# Patient Record
Sex: FEMALE | Race: WHITE
[De-identification: names, ages, dates, MRNs, and addresses within clinical notes are randomized per-mention and may not be internally consistent; named-entity substitution may affect disease eponyms.]

---

## 2019-02-12 ENCOUNTER — HOSPITAL ENCOUNTER (EMERGENCY)
Dept: HOSPITAL 44 - ED | Age: 19
Discharge: HOME | End: 2019-02-12
Payer: COMMERCIAL

## 2019-02-12 VITALS — SYSTOLIC BLOOD PRESSURE: 110 MMHG | DIASTOLIC BLOOD PRESSURE: 66 MMHG

## 2019-02-12 DIAGNOSIS — G40.89: Primary | ICD-10-CM

## 2019-02-12 LAB
APPEARANCE UR: (no result)
BASOPHILS NFR BLD: 0.5 % (ref 0–1.5)
CANNABINOIDS CTO UR-MCNC: NEGATIVE NG/ML (ref ?–50)
COLOR,URINE: YELLOW
EGFR (NON-AFRICAN): > 60
EOSINOPHIL NFR BLD: 2.2 % (ref 0–6.8)
MCH RBC QN AUTO: 28.4 PG (ref 28–34)
MCV RBC AUTO: 84 FL (ref 80–100)
MDMA UR-MCNC: NEGATIVE NG/ML (ref ?–500)
MONOCYTES %: 5.2 % (ref 0–11)
NEUTROPHILS #: 3.7 # K/UL (ref 1.4–7.7)
PH UR STRIP: 7.5 [PH] (ref 5–8)
RBC UR QL: NEGATIVE
UROBILINOGEN URINE: 1 EU (ref 0.2–1)

## 2019-02-12 PROCEDURE — S1016 NON-PVC INTRAVENOUS ADMINIST: HCPCS

## 2019-02-12 PROCEDURE — 85025 COMPLETE CBC W/AUTO DIFF WBC: CPT

## 2019-02-12 PROCEDURE — 99284 EMERGENCY DEPT VISIT MOD MDM: CPT

## 2019-02-12 PROCEDURE — 80320 DRUG SCREEN QUANTALCOHOLS: CPT

## 2019-02-12 PROCEDURE — 80377 DRUG/SUBSTANCE NOS 7/MORE: CPT

## 2019-02-12 PROCEDURE — G0481 DRUG TEST DEF 8-14 CLASSES: HCPCS

## 2019-02-12 PROCEDURE — 96374 THER/PROPH/DIAG INJ IV PUSH: CPT

## 2019-02-12 PROCEDURE — 81025 URINE PREGNANCY TEST: CPT

## 2019-02-12 PROCEDURE — G0480 DRUG TEST DEF 1-7 CLASSES: HCPCS

## 2019-02-12 PROCEDURE — 80053 COMPREHEN METABOLIC PANEL: CPT

## 2019-02-12 PROCEDURE — 81002 URINALYSIS NONAUTO W/O SCOPE: CPT

## 2019-02-12 PROCEDURE — 36415 COLL VENOUS BLD VENIPUNCTURE: CPT

## 2019-02-12 PROCEDURE — 99283 EMERGENCY DEPT VISIT LOW MDM: CPT

## 2019-02-12 NOTE — ED PHYSICIAN DOCUMENTATION
Seizure





- HISTORIAN


Historian: patient





- HPI


Stated Complaint: witnessed seizure at [facility] (history no meds)


Chief Complaint: Seizure


Timing/Onset/Duration: single episode, other (2 min (approx 10 GCS with EMS 

arrival via report) )


Last known Well Date: 02/12/19


Last Known Well Time: 09:00


Last known Well Code/Unknown Code: Unknown


Witnessed By: other (school )


Preceding Symptoms: none


Character of Seizure(s): "shaking all over", staring.  denies: incontinence of 

urine, incontinence of stool, stopped breathing, lost pulse


Postictal Symptoms: confusion, other (says she feels numb )


Location of Injury: none


Further Comments: yes (Per EMS she does not take any meds for seizures although 

she has a history of seizures. She is currently awake - alert minimally 

responsive. Shakes head no and yes. Or one word responses. No current seizure 

activity. No obvious injury noted via EMS or school officals)





- ROS


NEURO/PSYCH: denies: headache


EYES/ENT: none


CVS/RESP: none





- PAST HX


Previous seizure/seizure disorder: occasional (last one was 3 months ago - she 

was on seizure med and one year ago they removed her from the med )


Etiology: idiopathic


Other History: none


Surgeries/Procedures: other (heart surgery )


Immunizations: UTD


Allergies/Adverse Reactions: 


                                    Allergies











Allergy/AdvReac Type Severity Reaction Status Date / Time


 


red dye [Red Dye] Allergy Mild  Verified 02/12/19 10:43














Home Medications: 


                                Ambulatory Orders











 Medication  Instructions  Recorded


 


Birth Control   02/12/19














- SOCIAL HX


Smoking History: non-smoker


Alcohol Use: none


Drug Use: none





- FAMILY HX


Family History: none





- VITAL SIGNS


Vital Signs: 


                                   Vital Signs











Temp Pulse Resp BP Pulse Ox


 


 98.9 F   83   14   110/66   98 


 


 02/12/19 10:19  02/12/19 14:56  02/12/19 14:56  02/12/19 14:56  02/12/19 14:56














- REVIEWED ASSESSMENTS


Nursing Assessment  Reviewed: Yes


Vitals Reviewed: Yes





Progress





- Progress


Progress: 





1100: walked with one nurse to bathroom. She is slow to commands and is able to 

follow. She was asking for different pants due to complication of fit. DG


1120: resting in room mom at bedside DG 


1135: mom was requesting to feed her lunch. She is alert aware she is at the 

hospital. Remains slow to respond although awake. She states her stomach is 

burning and she has a headache. No other complaints. DG 


1150: she is anxious in room and jittery. She states she is feeling like she 

might seize again - med ordered DG 


1212: states she is feeling better. DG 


1235: sleeping quietly in room - VS stable DG 


1315: resting. She does respond to name and awken DG 


1420: She is in bed talking to mom. A/OX 3. She is asking to go home. Tolerated 

apple juice well. walked to bathroom. Parents are agreeable DG 





ED Results Lab/Radiology





- Lab Results


Lab Results: 


                                   Lab Results











  02/12/19 02/12/19 02/12/19





  Unknown Unknown 11:07


 


WBC    5.70 K/ul K/ul  





    (4.00-12.00)  


 


RBC    4.94 M/ul M/ul  





    (3.90-5.20)  


 


Hgb    14.0 g/dL g/dL  





    (12.0-16.0)  


 


Hct    41.5 % %  





    (34.5-46.5)  


 


MCV    84.0 fl fl  





    (80.0-100.0)  


 


MCH    28.4 pg pg  





    (28.0-34.0)  


 


MCHC    33.8 g/dL g/dL  





    (30.0-36.0)  


 


RDW    13.2 % %  





    (11.3-14.3)  


 


Plt Count    210 K/mm3 K/mm3  





    (130-400)  


 


Neut % (Auto)    65.0 % %  





    (39.0-79.0)  


 


Lymph % (Auto)    27.1 % %  





    (16.0-50.0)  


 


Mono % (Auto)    5.2 % %  





    (0.0-11.0)  


 


Eos % (Auto)    2.2 % %  





    (0.0-6.8)  


 


Baso % (Auto)    0.5   





    (0.0-1.5)  


 


Neut # (Auto)    3.7 # k/uL # k/uL  





    (1.4-7.7)  


 


Lymph # (Auto)    1.0 # k/uL # k/uL  





    (0.6-4.0)  


 


Mono # (Auto)    0.3 # k/uL # k/uL  





    (0.0-0.9)  


 


Eos # (Auto)    0.1 # k/uL # k/uL  





    (0.0-0.6)  


 


Baso # (Auto)    0.0 # k/uL # k/uL  





    (0.0-0.5)  


 


Sodium  145 mmol/L mmol/L    





   (136-145)   


 


Potassium  4.1 mmol/L mmol/L    





   (3.5-5.1)   


 


Chloride  105 mmol/L mmol/L    





   ()   


 


Carbon Dioxide  26 mmol/L mmol/L    





   (22-30)   


 


BUN  9 mg/dL mg/dL    





   (7-17)   


 


Creatinine  0.75 mg/dL mg/dL    





   (0.52-1.04)   


 


Estimated Creat Clear  127     





    


 


Est GFR ( Amer)  > 60     





  (60 - )  


 


Est GFR (Non-Af Amer)  > 60     





  (60 - )  


 


Glucose  97 mg/dL mg/dL    





   ()   


 


Calcium  9.4 mg/dL mg/dL    





   (8.4-10.2)   


 


Total Bilirubin  0.4 mg/dL mg/dL    





   (0.2-1.3)   


 


AST  28 U/L U/L    





   (15-46)   


 


ALT  33 U/L U/L    





   (13-69)   


 


Alkaline Phosphatase  76 U/L U/L    





   ()   


 


Total Protein  6.8 g/dL g/dL    





   (6.3-8.2)   


 


Albumin  4.3 g/dL g/dL    





   (3.5-5.0)   


 


Urine Color      





    


 


Urine Appearance      





    


 


Urine pH      





    


 


Ur Specific Gravity      





    


 


Urine Protein      





    


 


Urine Ketones      





    


 


Urine Occult Blood      





    


 


Urine Nitrite      





    


 


Urine Bilirubin      





    


 


Urine Urobilinogen      





    


 


Ur Leukocyte Esterase      





    


 


Urine Glucose      





    


 


Urine HCG, Qual      Negative 





     (NEGATIVE) 


 


Opiates Screen      





    


 


Oxycodone Screen      





    


 


Methadone Screen      





    


 


Acetaminophen  < 10.0 ug/mL L ug/mL    





   (10-30)   


 


Ur Barbiturates Screen      





    


 


Tricyclic Antidepress      





    


 


Phencyclidine Screen      





    


 


Amphetamines Screen      





    


 


U Methamphetamines Scrn      





    


 


MDMA      





    


 


Benzodiazepines Screen      





    


 


Urine Cocaine Screen      





    


 


U Cannabinoids Screen      





    


 


Ethyl Alcohol  < 10.0 mg/dL mg/dL    





   (0.0-10.0)   














  02/12/19 02/12/19





  11:07 10:29


 


WBC    





   


 


RBC    





   


 


Hgb    





   


 


Hct    





   


 


MCV    





   


 


MCH    





   


 


MCHC    





   


 


RDW    





   


 


Plt Count    





   


 


Neut % (Auto)    





   


 


Lymph % (Auto)    





   


 


Mono % (Auto)    





   


 


Eos % (Auto)    





   


 


Baso % (Auto)    





   


 


Neut # (Auto)    





   


 


Lymph # (Auto)    





   


 


Mono # (Auto)    





   


 


Eos # (Auto)    





   


 


Baso # (Auto)    





   


 


Sodium    





   


 


Potassium    





   


 


Chloride    





   


 


Carbon Dioxide    





   


 


BUN    





   


 


Creatinine    





   


 


Estimated Creat Clear    





   


 


Est GFR ( Amer)    





   


 


Est GFR (Non-Af Amer)    





   


 


Glucose    





   


 


Calcium    





   


 


Total Bilirubin    





   


 


AST    





   


 


ALT    





   


 


Alkaline Phosphatase    





   


 


Total Protein    





   


 


Albumin    





   


 


Urine Color  Yellow   





   (YELLOW)  


 


Urine Appearance  Cloudy  H   





   (CLEAR)  


 


Urine pH  7.5   





   (5.0 - 8.0)  


 


Ur Specific Gravity  1.020   





   (1.010-1.030)  


 


Urine Protein  Negative mg/dL mg/dL  





   (NEGATIVE)  


 


Urine Ketones  Negative mg/dL mg/dL  





   (NEGATIVE)  


 


Urine Occult Blood  Negative   





   (NEGATIVE)  


 


Urine Nitrite  Negative   





   (NEGATIVE)  


 


Urine Bilirubin  Negative   





   (NEGATIVE)  


 


Urine Urobilinogen  1.0 Eu Eu  





   (0.2-1.0)  


 


Ur Leukocyte Esterase  1+  H   





   (NEGATIVE)  


 


Urine Glucose  Negative mg/dL mg/dL  





   (NEGATIVE)  


 


Urine HCG, Qual    





   


 


Opiates Screen    Negative ng/mL ng/mL





    (<300) 


 


Oxycodone Screen    Negative ng/mL ng/mL





    (<100) 


 


Methadone Screen    Negative ng/mL ng/mL





    (<200) 


 


Acetaminophen    





   


 


Ur Barbiturates Screen    Negative ng.mL ng.mL





    (<200) 


 


Tricyclic Antidepress    Negative ng/mL ng/mL





    (<300) 


 


Phencyclidine Screen    Negative ng/mL ng/mL





    (< 25) 


 


Amphetamines Screen    Negative ng/mL ng/mL





    (<500) 


 


U Methamphetamines Scrn    Negative ng/mL ng/mL





    (<500) 


 


MDMA    Negative ng/mL ng/mL





    (<500) 


 


Benzodiazepines Screen    Negative ng/mL ng/mL





    (<150) 


 


Urine Cocaine Screen    Negative ng/mL ng/mL





    (<150) 


 


U Cannabinoids Screen    Negative ng/mL ng/mL





    (< 50) 


 


Ethyl Alcohol    





   














- Orders


Orders: 


                                    ED Orders











 Category Date Time Status


 


 IV Started NOW Care  02/12/19 10:15 Active


 


 ACETAMINOPHEN LEVEL Routine Lab  02/12/19 Completed


 


 ALCOHOL MEDICAL USE ONLY Routine Lab  02/12/19 Completed


 


 CBC/PLATELET/DIFF Routine Lab  02/12/19 Completed


 


 CMP Routine Lab  02/12/19 Completed


 


 DRUG SCREEN URINE MEDICAL ONLY Routine Lab  02/12/19 10:29 Completed


 


 SALICYLATE LEVEL Routine Lab  02/12/19 Received


 


 UA MACRO DIP ONLY Routine Lab  02/12/19 11:07 Completed


 


 URINE HCG Routine Lab  02/12/19 11:07 Completed


 


 0.9 % Sodium Chloride [Normal Saline] 1,000 ml Med  02/12/19 10:16 Discontinued





 IV NOW   


 


 0.9 % Sodium Chloride [Normal Saline] 1,000 ml Med  02/12/19 10:15 Discontinued





 IV Q1H   


 


 LORazepam [Ativan] Med  02/12/19 11:51 Discontinued





 1 mg IVP NOW ONE   














Seizure Physical Exam





- Physical Exam


General Appearance: no acute distress, alert


Altered Mental Status Higher Functions: alert, abnml respond to command, eyes 

open, slow to respond, disoriented


EENT: nml eye inspection, PERRL, nml ENT inspection


Neck/Back: normal inspection


Respiratory: no resp. distress, breath sounds nml, no evidence of rib injury


CVS: reg rate & rhythm, heart sounds normal, equal pulses, no murmur


Skin: warm/dry, normal color


Extremities: normal range of motion, non-tender, normal inspection


Observed Seizure Activity in ED: awake





Discharge


Clincal Impression: 


 Pseudoseizure





Referrals: 


Shari Carson MD [Primary Care Provider] - 2 Days


Comments: 





1. FOLLOW UP WITH PCP ASAP


2. rest - brain rest today


3. Increase fluids


4. Someone with her for next 24 hours


5. Return to ER for any concerns 


Condition: Stable


Disposition: 01 HOME, SELF-CARE


Decision to Admit: NO


Date of Decison to Admit: 02/12/19


Decision Time: 14:34

## 2019-04-03 ENCOUNTER — HOSPITAL ENCOUNTER (EMERGENCY)
Dept: HOSPITAL 44 - ED | Age: 19
Discharge: HOME | End: 2019-04-03
Payer: SELF-PAY

## 2019-04-03 VITALS
DIASTOLIC BLOOD PRESSURE: 62 MMHG | SYSTOLIC BLOOD PRESSURE: 110 MMHG | SYSTOLIC BLOOD PRESSURE: 110 MMHG | DIASTOLIC BLOOD PRESSURE: 62 MMHG

## 2019-04-03 DIAGNOSIS — G40.89: Primary | ICD-10-CM

## 2019-04-03 LAB
BASOPHILS NFR BLD: 0.8 % (ref 0–1.5)
EGFR (NON-AFRICAN): > 60
EOSINOPHIL NFR BLD: 1.6 % (ref 0–6.8)
MCH RBC QN AUTO: 28.1 PG (ref 28–34)
MCV RBC AUTO: 85 FL (ref 80–100)
MONOCYTES %: 6.2 % (ref 0–11)
NEUTROPHILS #: 3.4 # K/UL (ref 1.4–7.7)

## 2019-04-03 PROCEDURE — 36415 COLL VENOUS BLD VENIPUNCTURE: CPT

## 2019-04-03 PROCEDURE — 80053 COMPREHEN METABOLIC PANEL: CPT

## 2019-04-03 PROCEDURE — 96374 THER/PROPH/DIAG INJ IV PUSH: CPT

## 2019-04-03 PROCEDURE — 99284 EMERGENCY DEPT VISIT MOD MDM: CPT

## 2019-04-03 PROCEDURE — 85025 COMPLETE CBC W/AUTO DIFF WBC: CPT

## 2019-04-03 PROCEDURE — 99283 EMERGENCY DEPT VISIT LOW MDM: CPT

## 2019-04-03 NOTE — ED PHYSICIAN DOCUMENTATION
Seizure





- HISTORIAN


Historian: patient, paramedics ([facility])





- [facility]


Chief Complaint: Seizure (? Seizure)


Additional Information: 


Patient is a 18-year-old female who presents to the ER via [facility] from [facility] [employer] 

[employer].  Per EMS patient was having jerking movements of the extremity- per 

school nurse she had jerking of extremities for approx. 20 mins. When EMS got 

her in the truck they started talking to her and jerking stopped.  Last seizure 

episode was one month ago- episode was more anxiety.  She appears to be anxious 

today- denies any stress, denies problems at school or home.  She states that 

she has been working out more and lifting weights- we discussed healthy 

nutrition and hydration. 





Timing/Onset/Duration: unknown duration (maybe 20 min per school nurse), single 

episode


Last known Well Date: 04/03/19


Last Known Well Time: 09:00


Last known Well Code/Unknown Code: Known


Witnessed By: other (school/nurse)


Preceding Symptoms: none


Activity Prior to Seizure: sitting in class


Character of Seizure(s): shaking in one area (shaking of arms and legs).  

denies: lost consciousness, unresponsiveness


Postictal Symptoms: none


Location of Injury: none


Further Comments: yes (EMS state that she is appearing well- and she stopped 

activity)





- ROS


NEURO/PSYCH: denies: headache


EYES/ENT: none


CVS/RESP: none


GI/: denies: nausea, vomiting


MS/SKIN/LYMPH: none





- PAST HX


Previous seizure/seizure disorder: occasional (last episode 1 mth ago), other 

(not diagnosed- more anxitye)


Etiology: idiopathic


Other History: none


Surgeries/Procedures: none


Immunizations: UTD


Allergies/Adverse Reactions: 


                                    Allergies











Allergy/AdvReac Type Severity Reaction Status Date / Time


 


red dye [Red Dye] Allergy Mild  Verified 04/03/19 10:01














Home Medications: 


                                Ambulatory Orders











 Medication  Instructions  Recorded


 


Levonorgestrel-Eth Estra [Enpresse] 1 tab PO DAILY 02/12/19














- SOCIAL HX


Smoking History: non-smoker


Alcohol Use: none


Drug Use: none





- FAMILY HX


Family History: none





- VITAL SIGNS


Vital Signs: 


                                   Vital Signs











Temp Pulse Resp BP Pulse Ox


 


 98.4 F   74   20   110/62   99 


 


 04/03/19 09:37  04/03/19 11:11  04/03/19 11:11  04/03/19 11:11  04/03/19 11:11














ED Results Lab/Radiology





- Lab Results


Lab Results: 


                                   Lab Results











  04/03/19 04/03/19





  09:55 09:55


 


WBC    5.70 K/ul K/ul





    (4.00-12.00) 


 


RBC    4.56 M/ul M/ul





    (3.90-5.20) 


 


Hgb    12.8 g/dL g/dL





    (12.0-16.0) 


 


Hct    38.5 % %





    (34.5-46.5) 


 


MCV    85.0 fl fl





    (80.0-100.0) 


 


MCH    28.1 pg pg





    (28.0-34.0) 


 


MCHC    33.2 g/dL g/dL





    (30.0-36.0) 


 


RDW    13.3 % %





    (11.3-14.3) 


 


Plt Count    188 K/mm3 K/mm3





    (130-400) 


 


Neut % (Auto)    60.0 % %





    (39.0-79.0) 


 


Lymph % (Auto)    31.4 % %





    (16.0-50.0) 


 


Mono % (Auto)    6.2 % %





    (0.0-11.0) 


 


Eos % (Auto)    1.6 % %





    (0.0-6.8) 


 


Baso % (Auto)    0.8 % %





    (0.0-1.5) 


 


Neut # (Auto)    3.4 # k/uL # k/uL





    (1.4-7.7) 


 


Lymph # (Auto)    1.8 # k/uL # k/uL





    (0.6-4.0) 


 


Mono # (Auto)    0.4 # k/uL # k/uL





    (0.0-0.9) 


 


Eos # (Auto)    0.1 # k/uL # k/uL





    (0.0-0.6) 


 


Baso # (Auto)    0.1 # k/uL # k/uL





    (0.0-0.5) 


 


Sodium  138 mmol/L mmol/L  





   (136-145)  


 


Potassium  3.6 mmol/L mmol/L  





   (3.5-5.1)  


 


Chloride  103 mmol/L mmol/L  





   ()  


 


Carbon Dioxide  24 mmol/L mmol/L  





   (22-30)  


 


BUN  13 mg/dL mg/dL  





   (7-17)  


 


Creatinine  0.66 mg/dL mg/dL  





   (0.52-1.04)  


 


Est GFR ( Amer)  > 60   





  (60 - ) 


 


Est GFR (Non-Af Amer)  > 60   





  (60 - ) 


 


Glucose  90 mg/dL mg/dL  





   ()  


 


Calcium  9.1 mg/dL mg/dL  





   (8.4-10.2)  


 


Total Bilirubin  < 0.1 mg/dL L mg/dL  





   (0.2-1.3)  


 


AST  31 U/L U/L  





   (15-46)  


 


ALT  13 U/L U/L  





   (13-69)  


 


Alkaline Phosphatase  76 U/L U/L  





   ()  


 


Total Protein  7.4 g/dL g/dL  





   (6.3-8.2)  


 


Albumin  4.2 g/dL g/dL  





   (3.5-5.0)  














- Orders


Orders: 


                                    ED Orders











 Category Date Time Status


 


 Place IV Lock 1T Care  04/03/19 09:42 Active


 


 CBC/PLATELET/DIFF Stat Lab  04/03/19 09:55 Completed


 


 CMP Stat Lab  04/03/19 09:55 Completed


 


 LORazepam [Ativan] Med  04/03/19 09:42 Discontinued





 0.5 mg IV NOW ONE   














Seizure Physical Exam





- Physical Exam


General Appearance: alert, anxious


Altered Mental Status Higher Functions: alert, oriented x3


EENT: PERRL


Neck/Back: normal inspection, supple


Respiratory: breath sounds nml


CVS: heart sounds normal, equal pulses


Abdomen: non-tender, nml bowel sounds, no distention


Skin: warm/dry, normal color


Extremities: normal range of motion, non-tender, normal inspection, normal 

capillary refill


Observed Seizure Activity in ED: other (no seizure in ER )





Discharge


Clincal Impression: 


 Pseudoseizure





Referrals: 


Shari Carson MD [Primary Care Provider] - 2 Days


Additional Instructions: 


Increase fluid intake and Gatorade when working out.


Balanced diet while exercising


Follow up with PCP next week for follow up


Condition: Good


Disposition: 01 HOME, SELF-CARE


Decision to Admit: NO


Decision Time: 11:10

## 2019-04-12 ENCOUNTER — HOSPITAL ENCOUNTER (EMERGENCY)
Dept: HOSPITAL 44 - ED | Age: 19
Discharge: HOME | End: 2019-04-12
Payer: SELF-PAY

## 2019-04-12 DIAGNOSIS — M62.830: ICD-10-CM
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
## 2019-04-12 DIAGNOSIS — Z04.1: Primary | ICD-10-CM
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
## 2019-04-12 PROCEDURE — 72100 X-RAY EXAM L-S SPINE 2/3 VWS: CPT

## 2019-04-12 PROCEDURE — 99283 EMERGENCY DEPT VISIT LOW MDM: CPT
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
